# Patient Record
Sex: MALE | Race: WHITE | NOT HISPANIC OR LATINO | URBAN - METROPOLITAN AREA
[De-identification: names, ages, dates, MRNs, and addresses within clinical notes are randomized per-mention and may not be internally consistent; named-entity substitution may affect disease eponyms.]

---

## 2017-07-03 ENCOUNTER — GENERIC CONVERSION - ENCOUNTER (OUTPATIENT)
Dept: OTHER | Facility: OTHER | Age: 72
End: 2017-07-03

## 2017-10-02 ENCOUNTER — ALLSCRIPTS OFFICE VISIT (OUTPATIENT)
Dept: OTHER | Facility: OTHER | Age: 72
End: 2017-10-02

## 2017-10-03 NOTE — CONSULTS
Assessment  1  Chronic pain syndrome (338 4) (G89 4)   2  Post laminectomy syndrome (722 80) (M96 1)   3  Chronic lumbar radiculopathy (724 4) (M54 16)    Plan  Chronic lumbar radiculopathy, Chronic pain syndrome, Post laminectomy syndrome    · Schedule Surgery Treatment  Procedure  Status: Hold For - Scheduling  Requested for:  [id number]   Ordered; For: Chronic lumbar radiculopathy, Chronic pain syndrome, Post laminectomy syndrome; Ordered By: Taya Newberry Performed:  Due: [id number]    Discussion/Summary    This is a 75 yo male with chronic left sided LBP and posterior left leg pain  History of extensive T-L fusion  Underwent attempt of a Nuvectra perc SCS trial with Dr Nicolette Bain, but could not access epidural space  CT T/L with extensive T-L fusion with lumbar decompressions  Thoracic lamina appear intact  The reports dictate a thoracic fusion including T10-T13, but is T9 when counting from below  EMG confirms a chronic left L5-S1 radiculopathy  is a candidate for a SCS  Given these findings the option of an open trial was discussed with the patient and wife  The additional risk involved with revision surgery and exploration of fusion reviewed  placement of a Nuvectra paddle entering at top level of fusion in construct  Entry will be at T9-10 when counting from the bottom of the fusion to cover T8 and T9  This will also avoid adjacent level disease above the construct  is deciding on surgery and his options He wishes to follow up with the surgeon who performed his last fusion  minutes with patient with greater than 50% in care and coordination  The risks of surgery were reviewed with the patient and family and they wish to proceed  These risks include, but are not limited to bleeding, infection, paralysis, numbness, nerve injury, CSF leak, bowel/bladder incontinence, device malfunction, and failure of treatment   All questions were answered and appropriate contact information was given in case questions arise in the future  They will undergo preoperative clearance and be scheduled for surgery in the near future  Chief Complaint  Patient presents to office for consult of open trial       History of Present Illness  This is a very pleasant 73 yo male with chronic pain involving hi left sided lower back and left leg  He reports having a sharp left sided lower back pain, as well as sharp left buttock and posterior leg pain to his foot  He has an extensive spinal surgical history with a T-L fusion  The last surgery was in 9/2016 in [place] by Dr Guillaume Murray which did not relieve his pain  He underwent an attempt at a Nuvectra perc SCS trial by Dr Marrero 2, but the epidural space could not be accessed  a cane at baseline  Takes Hydrocodone 10mg or Tramadol as needed  Review of Systems    Constitutional: feeling poorly-and-feeling tired  Eyes: No complaints of eye pain, no red eyes, no discharge from eyes, no itchy eyes  ENT: no complaints of earache, no hearing loss, no nosebleeds, no nasal discharge, no sore throat, no hoarseness  Cardiovascular: No complaints of slow heart rate, no fast heart rate, no chest pain, no palpitations, no leg claudication, no lower extremity  Respiratory: No complaints of shortness of breath, no wheezing, no cough, no SOB on exertion, no orthopnea or PND  Gastrointestinal: No complaints of abdominal pain, no constipation, no nausea or vomiting, no diarrhea or bloody stools  Genitourinary: No complaints of dysuria, no incontinence, no hesitancy, no nocturia, no genital lesion, no testicular pain  Musculoskeletal: limb pain-and-left leg pain, but-no joint swelling,-no joint stiffness-and-no limb swelling  Neurological: numbness,-tingling,-limb weakness,-difficulty walking-and-lefty foot N&T, but-no headache,-no confusion,-no dizziness,-no convulsions-and-no fainting     Psychiatric: Is not suicidal, no sleep disturbances, no anxiety or depression, no change in personality, no emotional problems  Endocrine: No complaints of proptosis, no hot flashes, no muscle weakness, no erectile dysfunction, no deepening of the voice, no feelings of weakness  Hematologic/Lymphatic: No complaints of swollen glands, no swollen glands in the neck, does not bleed easily, no easy bruising  ROS reviewed  Past Medical History    The active problems and past medical history were reviewed and updated today  Surgical History    The surgical history was reviewed and updated today  Family History    The family history was reviewed and updated today  Social History  The social history was reviewed and updated today  Current Meds    The medication list was reviewed and updated today  Vitals  Vital Signs    Recorded: [id number] 03:45PM   Temperature 98 7 F, Tympanic   Heart Rate 75, L Brachial Artery   Pulse Quality Normal, L Brachial Artery   Respiration Quality Normal   Respiration 16   Systolic 475, LUE, Sitting   Diastolic 86, LUE, Sitting   Height 5 ft 8 in   Weight 210 lb    BMI Calculated 31 93   BSA Calculated 2 09     Physical Exam     Constitutional Patient appears healthy and well developed  Head and Face Normal on inspection  Respiratory Respiratory effort: Normal    Musculo: Spine Contour is normal  No tenderness of the spine billaterally  Skin warm and dry  Neurologic - Mental Status: Alert and Oriented x3  -Mood and affect: Affect is normal  -Memory is grossly intact  -Attention is WNL    -Speech: Language is fluent  Cranial Nerve Exam:  2nd cranial nerve: Intact  -3rd, 4th, and 6th cranial nerves: Intact  -5th cranial nerve: Intact  -7th cranial nerve: Intact -8th cranial nerve: Intact  -9th and 10th cranial nerves: Intact  -11th cranial nerve: Intact  -12th cranial nerve: Intact  Motor System General Motor Strength: No pronator drift and no parietal drift  Motor System - Upper Extremities: Muscle strength: 5/5 bilaterally   -Muscle tone: Normal bilaterally  Motor System - Lower Extremities: Muscle strength: 5/5 bilaterally  -Muscle tone: Normal bilaterally  Reflexes: DTR's are brisk, symmetric and 2+ bilaterally  Babinski's reflex is down going bilaterally symmetrical bilaterally  Coordination: Normal    Sensory: Sensation grossly intact to light touch  Sensation grossly intact to pinprick  Gait and Station: [place] with a normal gait  -Cane  Results/Data    I personally reviewed the SCS trial, CT T/L spine, EMG in detail with the patient  X-ray Review Reviewed report by Dr Latisha Geiger in which a Nuvectra perc SCS trial could not be performed  CT Scan Review Reviewed images and report of CT T/L spine 3/2017  Extensive T-L fusion with lumbar decompressions  Thoracic lamina appear intact  The reports dictate a thoracic fusion including T10-T13, but is T9 when counting from below  Results Review Reviewed results of EMG 1/2017 by Dr Rahul Russo  Chronic Left L5-S1 radiculopathy  Surgery Scheduling Form    Location:   Confirmation Number:   Procedure Date:   Requested Time:   Surgeon: Debra Castanon  Co-Surgeon:   [facility] Required:   Bed:   Anesthesia: General      PROCEDURE DETAILS   Procedure: Placement of a Thoracic SCS via laminectomy for open trial, Exploration of previous fusion  Laterality/Level: Anticipated frozen section:   Pre-Op Diagnosis: Chronic pain, post pop  Dx Code(s):   Length of Procedure: 2 hours  Equipment: C-Arm  Equipment Needs: Prone,-Motors (tceMEP),-EMG,-SSEP  Implants/Representative: Adela Delfina   Is the patient able to walk up a flight of stairs, walk up a hill or do heavy housework WITHOUT having chest pain or shortness of breath?      REGISTRATION & FINANCIAL CLEARANCE    FA Initials:   Insurance:   Policy Number: Group Number:     PRE-ADMISSION TESTING/CLINICAL INFORMATION   PAT Location:     Communication Barrier:   Primary Care Physician:     CONSULTS NEEDED:   Anesthesia Consult:   Medical Consult:   Cardiac Consult:     ALLERGIES AND ALERTS   Latex Allergy:   Penicillin Allergy:   Malignant Hyperthermia:   Diabetic Patient:   Height:   Weight:     COMMENTS   Scheduling Information Provided by:     IN OFFICE USE   Urgency:   Craniotomy Details:   Lab Studies Ordered: Full Labs Ordered,-Medically Cleared  Films   Bracing:   Bone Stimulator   Surgre in one week  Location:   Confirmation Number:   Procedure Date:   Requested Time:   Surgeon: Lucía Hernandez  Co-Surgeon:   [facility] Required:   Bed:   Anesthesia: General      PROCEDURE DETAILS   Procedure Revision of a thoracic SCS with placement left buttock generator versus removal of a thoracic SCS electrode placed via lameimctomy  Laterality/Level: Anticipated frozen section:   Pre-Op Diagnosis: Chronic pain, post pop, s/p open trial    Dx Code(s):   Length of Procedure: 1 5 hours  Equipment:   Equipment Needs: Prone,-SSEP  Implants/Representative: Jaylen Pradeep   Is the patient able to walk up a flight of stairs, walk up a hill or do heavy housework WITHOUT having chest pain or shortness of breath? REGISTRATION & FINANCIAL CLEARANCE    FA Initials:   Insurance:   Policy Number: Group Number:     PRE-ADMISSION TESTING/CLINICAL INFORMATION   PAT Location:     Communication Barrier:   Primary Care Physician:     CONSULTS NEEDED:   Anesthesia Consult:   Medical Consult:   Cardiac Consult:     ALLERGIES AND ALERTS   Latex Allergy:   Penicillin Allergy:   Malignant Hyperthermia:   Diabetic Patient:   Height:   Weight:     COMMENTS   Scheduling Information Provided by:     IN OFFICE USE   Urgency:   Craniotomy Details:   Lab Studies Ordered: Full Labs Ordered,-Medically Cleared  Films   Bracing:   Bone Stimulator   Return to office 2 Weeks post op-  6 weeks   Signatures   Electronically signed by : JHON Early ; Oct  2 2017  5:16PM EST                       (Author)

## 2018-01-13 VITALS
SYSTOLIC BLOOD PRESSURE: 172 MMHG | BODY MASS INDEX: 31.83 KG/M2 | TEMPERATURE: 98.7 F | HEART RATE: 75 BPM | RESPIRATION RATE: 16 BRPM | WEIGHT: 210 LBS | HEIGHT: 68 IN | DIASTOLIC BLOOD PRESSURE: 86 MMHG